# Patient Record
Sex: MALE | Race: WHITE
[De-identification: names, ages, dates, MRNs, and addresses within clinical notes are randomized per-mention and may not be internally consistent; named-entity substitution may affect disease eponyms.]

---

## 2021-10-05 ENCOUNTER — HOSPITAL ENCOUNTER (EMERGENCY)
Dept: HOSPITAL 63 - ER | Age: 51
Discharge: HOME | End: 2021-10-05
Payer: OTHER GOVERNMENT

## 2021-10-05 VITALS — HEIGHT: 74 IN | BODY MASS INDEX: 35.65 KG/M2 | WEIGHT: 277.78 LBS

## 2021-10-05 VITALS — SYSTOLIC BLOOD PRESSURE: 157 MMHG | DIASTOLIC BLOOD PRESSURE: 108 MMHG

## 2021-10-05 DIAGNOSIS — Z88.0: ICD-10-CM

## 2021-10-05 DIAGNOSIS — K59.00: ICD-10-CM

## 2021-10-05 DIAGNOSIS — I10: Primary | ICD-10-CM

## 2021-10-05 DIAGNOSIS — R10.31: ICD-10-CM

## 2021-10-05 LAB
ALBUMIN SERPL-MCNC: 3.8 G/DL (ref 3.4–5)
ALBUMIN/GLOB SERPL: 1.2 {RATIO} (ref 1–1.7)
ALP SERPL-CCNC: 77 U/L (ref 46–116)
ALT SERPL-CCNC: 50 U/L (ref 16–63)
ANION GAP SERPL CALC-SCNC: 4 MMOL/L (ref 6–14)
AST SERPL-CCNC: 27 U/L (ref 15–37)
BASOPHILS # BLD AUTO: 0 X10^3/UL (ref 0–0.2)
BASOPHILS NFR BLD: 1 % (ref 0–3)
BILIRUB SERPL-MCNC: 0.5 MG/DL (ref 0.2–1)
BUN/CREAT SERPL: 26 (ref 6–20)
CA-I SERPL ISE-MCNC: 21 MG/DL (ref 8–26)
CALCIUM SERPL-MCNC: 8.9 MG/DL (ref 8.5–10.1)
CHLORIDE SERPL-SCNC: 105 MMOL/L (ref 98–107)
CO2 SERPL-SCNC: 31 MMOL/L (ref 21–32)
CREAT SERPL-MCNC: 0.8 MG/DL (ref 0.7–1.3)
EOSINOPHIL NFR BLD: 0.1 X10^3/UL (ref 0–0.7)
EOSINOPHIL NFR BLD: 3 % (ref 0–3)
ERYTHROCYTE [DISTWIDTH] IN BLOOD BY AUTOMATED COUNT: 13.4 % (ref 11.5–14.5)
GFR SERPLBLD BASED ON 1.73 SQ M-ARVRAT: 101.5 ML/MIN
GLOBULIN SER-MCNC: 3.3 G/DL (ref 2.2–3.8)
GLUCOSE SERPL-MCNC: 105 MG/DL (ref 70–99)
HCT VFR BLD CALC: 46.1 % (ref 39–53)
HGB BLD-MCNC: 15.5 G/DL (ref 13–17.5)
LIPASE: 104 U/L (ref 73–393)
LYMPHOCYTES # BLD: 0.9 X10^3/UL (ref 1–4.8)
LYMPHOCYTES NFR BLD AUTO: 16 % (ref 24–48)
MCH RBC QN AUTO: 30 PG (ref 25–35)
MCHC RBC AUTO-ENTMCNC: 34 G/DL (ref 31–37)
MCV RBC AUTO: 89 FL (ref 79–100)
MONO #: 0.5 X10^3/UL (ref 0–1.1)
MONOCYTES NFR BLD: 9 % (ref 0–9)
NEUT #: 4 X10^3UL (ref 1.8–7.7)
NEUTROPHILS NFR BLD AUTO: 72 % (ref 31–73)
PLATELET # BLD AUTO: 155 X10^3/UL (ref 140–400)
POTASSIUM SERPL-SCNC: 3.9 MMOL/L (ref 3.5–5.1)
PROT SERPL-MCNC: 7.1 G/DL (ref 6.4–8.2)
RBC # BLD AUTO: 5.16 X10^6/UL (ref 4.3–5.7)
SODIUM SERPL-SCNC: 140 MMOL/L (ref 136–145)
WBC # BLD AUTO: 5.6 X10^3/UL (ref 4–11)

## 2021-10-05 PROCEDURE — 85025 COMPLETE CBC W/AUTO DIFF WBC: CPT

## 2021-10-05 PROCEDURE — 74174 CTA ABD&PLVS W/CONTRAST: CPT

## 2021-10-05 PROCEDURE — 93005 ELECTROCARDIOGRAM TRACING: CPT

## 2021-10-05 PROCEDURE — 80053 COMPREHEN METABOLIC PANEL: CPT

## 2021-10-05 PROCEDURE — 82553 CREATINE MB FRACTION: CPT

## 2021-10-05 PROCEDURE — 83690 ASSAY OF LIPASE: CPT

## 2021-10-05 PROCEDURE — 84484 ASSAY OF TROPONIN QUANT: CPT

## 2021-10-05 PROCEDURE — 99285 EMERGENCY DEPT VISIT HI MDM: CPT

## 2021-10-05 PROCEDURE — 36415 COLL VENOUS BLD VENIPUNCTURE: CPT

## 2021-10-05 PROCEDURE — 83605 ASSAY OF LACTIC ACID: CPT

## 2021-10-05 NOTE — PHYS DOC
Past History


Past Surgical History:  No Surgical History


 (YANG SHEETS)


Alcohol Use:  Rarely


 (YANG SHEETS)





General Adult


EDM:


Chief Complaint:  ABDOMINAL PAIN





HPI:


HPI:





Patient is a 51 year old male with history of hypertension who presents from 

urgent care with 3-day history of right lower quadrant abdominal pain.  Patient 

states his pain began in the right lower quadrant and now radiates to the 

central periumbilical region.  His pain at rest is 3/10, but movement greater 

than 7 minutes exacerbates the pain and causes him to become diaphoretic as 

well.  Patient reports associated nausea and constipation, but denies vomiting 

and diarrhea.  He has never had similar symptoms before and denies any prior 

abdominal surgeries.  Patient did not take his hypertension medications this 

morning.  Patient denies headache, vision changes, chest pain, palpitations, 

shortness of breath and cough.


 (YANG SHEETS)





Review of Systems:


Review of Systems:


Constitutional:  Denies fever or chills 


Respiratory:  See HPI


Cardiovascular:  See HPI


GI:  See HPI 


: Denies dysuria or hematuria


Musculoskeletal:  Denies back pain or joint pain 


Neurologic:  See HPI 


 (YANG SHEETS)





Current Medications:


Current Meds:





Current Medications








 Medications


  (Trade)  Dose


 Ordered  Sig/[place]  Start Time


 Stop Time Status Last Admin


Dose Admin


 


 Iohexol


  (Omnipaque 300


 Mg/ml)  75 ml  1X  ONCE  10/5/21 10:15


 10/5/21 10:21 DC  





 


 Iohexol


  (Omnipaque 350


 Mg/ml)  100 ml  1X  ONCE  10/5/21 10:30


 10/5/21 10:31   





 


 Ondansetron HCl


  (Zofran)  4 mg  PRN 1X  PRN  10/5/21 10:15


     











 (YANG SHEETS)





Allergies:


Allergies:





Allergies








Coded Allergies Type Severity Reaction Last Updated Verified


 


  Penicillins Allergy Unknown  10/5/21 Yes








 (YANG SHEETS)





Physical Exam:


PE:





Constitutional: Well developed, well nourished, no acute distress, non-toxic 

appearance. 


Neck: Normal range of motion, no tenderness, supple, no stridor. 


Cardiovascular: Heart rate regular rhythm, no murmur.


Lungs & Thorax:  Bilateral breath sounds clear to auscultation.


Abdomen: Bowel sounds normal, soft, no tenderness, no rebound/guarding, negative

McBurney's point tenderness, negative Rovsing sign, no masses, no pulsatile 

masses.


Skin: Warm, dry, no erythema, no rash.  


Extremities: No tenderness, no cyanosis, no clubbing, ROM intact, no edema. 


Neurologic: Alert and oriented x3, normal motor function, normal sensory 

function, no focal deficits noted.


 (YANG SHEETS)





Current Patient Data:


Labs:





Laboratory Tests








Test


 10/5/21


10:28


 


White Blood Count


 5.6 x10^3/uL


(4.0-11.0)


 


Red Blood Count


 5.16 x10^6/uL


(4.30-5.70)


 


Hemoglobin


 15.5 g/dL


(13.0-17.5)


 


Hematocrit


 46.1 %


(39.0-53.0)


 


Mean Corpuscular Volume 89 fL () 


 


Mean Corpuscular Hemoglobin 30 pg (25-35) 


 


Mean Corpuscular Hemoglobin


Concent 34 g/dL


(31-37)


 


Red Cell Distribution Width


 13.4 %


(11.5-14.5)


 


Platelet Count


 155 x10^3/uL


(140-400)


 


Neutrophils (%) (Auto) 72 % (31-73) 


 


Lymphocytes (%) (Auto) 16 % (24-48) 


 


Monocytes (%) (Auto) 9 % (0-9) 


 


Eosinophils (%) (Auto) 3 % (0-3) 


 


Basophils (%) (Auto) 1 % (0-3) 


 


Neutrophils # (Auto)


 4.0 x10^3uL


(1.8-7.7)


 


Lymphocytes # (Auto)


 0.9 x10^3/uL


(1.0-4.8)


 


Monocytes # (Auto)


 0.5 x10^3/uL


(0.0-1.1)


 


Eosinophils # (Auto)


 0.1 x10^3/uL


(0.0-0.7)


 


Basophils # (Auto)


 0.0 x10^3/uL


(0.0-0.2)


 


Sodium Level


 140 mmol/L


(136-145)


 


Potassium Level


 3.9 mmol/L


(3.5-5.1)


 


Chloride Level


 105 mmol/L


()


 


Carbon Dioxide Level


 31 mmol/L


(21-32)


 


Anion Gap 4 (6-14) 


 


Blood Urea Nitrogen


 21 mg/dL


(8-26)


 


Creatinine


 0.8 mg/dL


(0.7-1.3)


 


Estimated GFR


(Cockcroft-Gault) 101.5 





 


BUN/Creatinine Ratio 26 (6-20) 


 


Glucose Level


 105 mg/dL


(70-99)


 


Lactic Acid Level


 1.1 mmol/L


(0.4-2.0)


 


Calcium Level


 8.9 mg/dL


(8.5-10.1)


 


Total Bilirubin


 0.5 mg/dL


(0.2-1.0)


 


Aspartate Amino Transf


(AST/SGOT) 27 U/L (15-37) 





 


Alanine Aminotransferase


(ALT/SGPT) 50 U/L (16-63) 





 


Alkaline Phosphatase


 77 U/L


()


 


Creatine Kinase


 181 U/L


()


 


Creatine Kinase MB (Mass)


 4.3 ng/mL


(0.0-3.6)


 


Creatine Kinase MB Relative


Index 2.4 % (0-4) 





 


Troponin I Quantitative


 < 0.017 ng/mL


(0-0.055)


 


Total Protein


 7.1 g/dL


(6.4-8.2)


 


Albumin


 3.8 g/dL


(3.4-5.0)


 


Albumin/Globulin Ratio 1.2 (1.0-1.7) 


 


Lipase


 104 U/L


()








Vital Signs:





                                   Vital Signs








  Date Time  Temp Pulse Resp B/P (MAP) Pulse Ox O2 Delivery O2 Flow Rate FiO2


 


10/5/21 10:05 97.8 73 18 157/108 (124) 97 Room Air  








 (YANG SHEETS)





EKG:


EKG:


EKG Interpreted by Dr. Bazan: Regular rate 64bpm and rhythm with no ectopic be

ats.  No concerning ST-T wave changes.  Regular QR interval. 


 (YANG SHEETS)





Radiology/Procedures:


Radiology/Procedures:


PROCEDURE: CT ANGIOGRAPHY ABD AND PELVIS





CT of the abdomen and pelvis with contrast  10/5/2021 11:14 AM





Indication:  Reason: intermittent umbellical/rlq abd pain / Spl. Instructions: 

OMNI 350 100 ML / History: 





Comparison study: None available





Technique: Multidetector CT imaging of the abdomen and pelvis was performed 

following the administration of IV contrast.





Findings: 





The partially visualized lung bases demonstrate no acute abnormality.





The liver, gallbladder, spleen, bilateral adrenal glands, bilateral kidneys, and

pancreas, are grossly unremarkable. There is no bowel obstruction. No evidence 

of acute inflammatory change involving visualized bowel is identified. Appendix 

is visualized and unremarkable in appearance. Bladder is grossly unremarkable. 

No free fluid or free air is seen in the abdomen or pelvis.  No acute osseous 

changes are identified.





Impression:  No evidence of acute intra-abdominal abnormality is identified.


 








CT DOSING PQRS STATEMENT: 


One or more of the following individualized dose reduction techniques were 

utilized for this examination: 


 1. Automated exposure control 


 2. Adjustment of the mA and/or kV according to patient size  


3. Use of iterative reconstruction technique


 





Electronically signed by: Deonte Arellano MD (10/5/2021 11:14 AM) [id number]


 (YANG SHEETS)





Heart Score:


C/O Chest Pain:  No


 (YANG SHEETS)





Course & Med Decision Making:


Course & Med Decision Making


Pertinent Labs and Imaging studies reviewed. (See chart for details)





Patient presentation is not consistent with acute appendicitis, however may be 

indicative of abdominal claudication.  Work-up today will include EKG, blood 

work as well as abdomen/pelvis angiography.  Patient states he does not need 

antiemetics at this time, however a dose of Zofran was ordered as needed.





Work-up today was unremarkable aside from a slightly elevated CK-MB, which could

be due to dehydration or consistently elevated blood pressure.  Patient will be 

instructed to follow-up with his PCP regarding hypertension management.  He will

be provided with Zofran so that he might be able to hydrate and eat more 

comfortably.  Patient is agreeable to discharge plan.


 (YANG SHEETS)





Dragon Disclaimer:


Dragon Disclaimer:


This electronic medical record was generated, in whole or in part, using a voice

recognition dictation system.


 (YANG SHEETS)





Departure


Departure:


Impression:  


   Primary Impression:  


   Abdominal pain, non-surgical


   Additional Impression:  


   Hypertension not at goal


Disposition:  01 HOME / SELF CARE / HOMELESS


Condition:  STABLE


Referrals:  


BERTHA DORADO-C (PCP)


Patient Instructions:  Abdominal Pain, Easy-to-Read





Additional Instructions:  


Your work-up in the emergency department today did not find any acute pathology 

including appendicitis, inflammation of the intestines, blood clots within the 

intestines or acute cardiac damage.  Will be very important to hydrate and eat a

bland diet (BRAT - bananas, rice, applesauce, toast/crackers) for the next 

couple of days.  You may advance your diet to your normal eating habits as 

tolerated.  If your abdominal pain and nausea do not resolve, or you develop new

symptoms, please return to the emergency department.





Additionally, you can follow-up with your primary care provider regarding your 

blood pressure medications should your blood pressure remain elevated throughout

the day.  It is best to keep a daily log of your blood pressure, preferably at 

the same time each day, to bring with you to your regular appointments.


Scripts


Ondansetron Hcl (ZOFRAN) 4 Mg Tablet


4 MG PO TID PRN PRN for NAUSEA, #15 TAB


   Take 1 tablet by mouth as needed up to 3 times per day for


   nausea.


   Prov: YANG SHEETS         10/5/21





Attending Signature


Attending Signature


I have reviewed the PA/NP's note and plan of care. I was available for 

consultation as needed during the patient's visit in the emergency department. I

agree with the clinical impression, plan, and disposition.


 (ROMINA BAZAN DO)











YANG SHEETS               Oct 5, 2021 10:38


ROMINA BAZAN DO              Oct 5, 2021 13:58

## 2021-10-05 NOTE — RAD
CT of the abdomen and pelvis with contrast  10/5/2021 11:14 AM



Indication:  Reason: intermittent umbellical/rlq abd pain / Spl. Instructions: OMNI 350 100 ML / Hist
ory: 



Comparison study: None available



Technique: Multidetector CT imaging of the abdomen and pelvis was performed following the administrat
ion of IV contrast.



Findings: 



The partially visualized lung bases demonstrate no acute abnormality.



The liver, gallbladder, spleen, bilateral adrenal glands, bilateral kidneys, and pancreas, are grossl
y unremarkable. There is no bowel obstruction. No evidence of acute inflammatory change involving vis
ualized bowel is identified. Appendix is visualized and unremarkable in appearance. Bladder is grossl
y unremarkable. No free fluid or free air is seen in the abdomen or pelvis.  No acute osseous changes
 are identified.



Impression:  No evidence of acute intra-abdominal abnormality is identified.

 





CT DOSING PQRS STATEMENT: 

One or more of the following individualized dose reduction techniques were utilized for this examinat
ion: 

 1. Automated exposure control 

 2. Adjustment of the mA and/or kV according to patient size  

3. Use of iterative reconstruction technique

 



Electronically signed by: Deonte Arellano MD (10/5/2021 11:14 AM) [id number]

## 2021-10-05 NOTE — EKG
[facility] [address], [place] [address]

Test Date:    2021-10-05               Test Time:    10:18:44

Pat Name:     ROMINA HO        Department:   

Patient ID:   [id number]           Room:          

Gender:       M                        Technician:   ALYSSA

:          1970               Requested By: YANG SHEETS

Order Number: [id number]            Reading MD:   Darrius Patten

                                 Measurements

Intervals                              Axis          

Rate:         64                       P:            23

TN:           154                      QRS:          -2

QRSD:         92                       T:            20

QT:           384                                    

QTc:          400                                    

                           Interpretive Statements

SINUS RHYTHM

LEFTWARD AXIS

Electronically Signed On 10-5-2021 13:27:23 CDT by Darrius Patten